# Patient Record
Sex: FEMALE | Race: WHITE | NOT HISPANIC OR LATINO | Employment: FULL TIME | RURAL
[De-identification: names, ages, dates, MRNs, and addresses within clinical notes are randomized per-mention and may not be internally consistent; named-entity substitution may affect disease eponyms.]

---

## 2017-01-16 ENCOUNTER — OFFICE VISIT (OUTPATIENT)
Dept: CARDIOLOGY | Facility: CLINIC | Age: 47
End: 2017-01-16

## 2017-01-16 VITALS
SYSTOLIC BLOOD PRESSURE: 104 MMHG | BODY MASS INDEX: 39.27 KG/M2 | RESPIRATION RATE: 20 BRPM | DIASTOLIC BLOOD PRESSURE: 76 MMHG | WEIGHT: 230 LBS | HEART RATE: 68 BPM | HEIGHT: 64 IN

## 2017-01-16 DIAGNOSIS — I25.10 CORONARY ARTERY DISEASE INVOLVING NATIVE CORONARY ARTERY OF NATIVE HEART WITHOUT ANGINA PECTORIS: Primary | ICD-10-CM

## 2017-01-16 PROCEDURE — 93000 ELECTROCARDIOGRAM COMPLETE: CPT | Performed by: INTERNAL MEDICINE

## 2017-01-16 PROCEDURE — 99213 OFFICE O/P EST LOW 20 MIN: CPT | Performed by: INTERNAL MEDICINE

## 2017-01-16 NOTE — PROGRESS NOTES
Erna Lam  1970  Date of Office Visit: 01/16/2017  Encounter Provider: Vini Lorenzo MD  Place of Service: [facility] CARDIOLOGY    Chief complaint  Follow-up coronary artery disease. Hyperlipidemia. Tobacco abuse.      History of Present Illness  Dr. Blackwood:   I had the pleasure of seeing your patient back in follow-up today. As you well know, she  is a very pleasant, 46-year-old female with a medical history of hyperlipidemia, diabetes,  and tobacco abuse who came to me with a report of chest discomfort, typically during times  of stress. She denied any chest discomfort during exertion. Her chest pain was three to  four out of intensity and dull and pressure-like. It did not radiate and nothing made it  better.      She had an evaluation by you, which showed her to have a suboptimal exercise capacity  during Myoview stress test and did have evidence of anterior ischemia.      I took her for a catheterization and really all she had was a 50% mid left anterior  descending stenosis that certainly did not appear to be hemodynamically significant. She  had diffuse 20% stenoses in the other vessel and preserved ejection fraction.      I placed her on medical management for coronary artery disease including aspirin  and Imdur along with a beta-blocker. She did not like the way the beta-blocker made her  feel and felt that it made her depressed. She stopped that.       She states that over the holiday season she had a rough go of it; dealing with bronchitis and a runny nose along with sore throat.  She was on a couple different antibiotic regimens including Cipro for a very short period of time and then a Z-Yann and some cough syrup.  She states that this has improved.  She still reports that very infrequently, once every few months, that she will have chest discomfort with times of stress or anxiety.  She does not have chest discomfort with activity.  The discomfort that she  has is mild in intensity, short in duration lasting less than 5 minutes, and once again, not exertional.  No orthopnea, PND or lower extremity edema.  No other associated symptoms.            Review of Systems   Constitution: Negative for fever, weakness and malaise/fatigue.   HENT: Negative for nosebleeds and sore throat.    Eyes: Negative for blurred vision and double vision.   Cardiovascular: Positive for chest pain. Negative for claudication, palpitations and syncope.   Respiratory: Negative for cough, shortness of breath and snoring.    Endocrine: Negative for cold intolerance, heat intolerance and polydipsia.   Skin: Negative for itching, poor wound healing and rash.   Musculoskeletal: Negative for joint pain, joint swelling, muscle weakness and myalgias.   Gastrointestinal: Negative for abdominal pain, melena, nausea and vomiting.   Neurological: Negative for light-headedness, loss of balance, seizures and vertigo.   Psychiatric/Behavioral: Negative for altered mental status and depression.          Past Medical History   Diagnosis Date   • Abnormal stress test    • Chest pain on exertion      q   • Coronary artery disease    • Hyperlipidemia        The following portions of the patient's history were reviewed and updated as appropriate: Social history , Family history and Surgical history     Current Outpatient Prescriptions on File Prior to Visit   Medication Sig Dispense Refill   • aspirin 81 MG tablet Take 1 tablet by mouth daily.     • citalopram (CeleXA) 40 MG tablet Take 1 tablet by mouth daily.     • clonazePAM (KlonoPIN) 0.5 MG tablet Take 1 tablet by mouth daily as needed.     • exenatide (BYETTA 10 MCG PEN) 10 MCG/0.04ML injection Inject 10 mcg under the skin 2 (two) times a day.     • glimepiride (AMARYL) 4 MG tablet Take 1 tablet by mouth daily.     • isosorbide mononitrate (IMDUR) 30 MG 24 hr tablet Take 1 tablet by mouth Daily. 90 tablet 0   • metFORMIN (GLUCOPHAGE) 500 MG tablet Take 1 tablet  "by mouth 2 (two) times a day.     • pantoprazole (PROTONIX) 40 MG EC tablet Take 1 tablet by mouth daily.     • rosuvastatin (CRESTOR) 10 MG tablet Take 1 tablet by mouth Daily. 90 tablet 0   • [DISCONTINUED] fluticasone (FLONASE) 50 MCG/ACT nasal spray 1 spray into each nostril daily.  0   • [DISCONTINUED] Insulin Pen Needle (B-D UF III MINI PEN NEEDLES) 31G X 5 MM misc        No current facility-administered medications on file prior to visit.        Allergies   Allergen Reactions   • Bupropion    • Duloxetine    • Niacin    • Pravastatin    • Pregabalin    • Propoxyphene        Vitals:    01/16/17 1110   BP: 104/76   Pulse: 68   Resp: 20   Weight: 230 lb (104 kg)   Height: 64\" (162.6 cm)     Physical Exam   Constitutional: She is oriented to person, place, and time. She appears well-developed and well-nourished.   HENT:   Head: Normocephalic and atraumatic.   Eyes: Conjunctivae and EOM are normal. No scleral icterus.   Neck: Normal range of motion. Neck supple. Normal carotid pulses, no hepatojugular reflux and no JVD present. Carotid bruit is not present. No tracheal deviation present. No thyromegaly present.   Cardiovascular: Normal rate and regular rhythm.  Exam reveals no gallop and no friction rub.    No murmur heard.  Pulses:       Carotid pulses are 2+ on the right side, and 2+ on the left side.       Radial pulses are 2+ on the right side, and 2+ on the left side.        Femoral pulses are 2+ on the right side, and 2+ on the left side.       Dorsalis pedis pulses are 2+ on the right side, and 2+ on the left side.        Posterior tibial pulses are 2+ on the right side, and 2+ on the left side.   Pulmonary/Chest: Breath sounds normal. No respiratory distress. She has no decreased breath sounds. She has no wheezes. She has no rhonchi. She has no rales. She exhibits no tenderness.   Abdominal: Soft. Bowel sounds are normal. She exhibits no distension. There is no tenderness. There is no rebound. "   Musculoskeletal: She exhibits no edema or deformity.   Neurological: She is alert and oriented to person, place, and time. She has normal strength. No sensory deficit.   Skin: No rash noted. No erythema.   Psychiatric: She has a normal mood and affect. Her behavior is normal.       No components found for: CBC  No results found for: CMP  No components found for: LIPID  No results found for: BMP      ECG 12 Lead  Date/Time: 1/16/2017 11:43 AM  Performed by: CATERINA PARKINSON  Authorized by: CATERINA PARKINSON   Comparison: compared with previous ECG from 7/11/2016  Similar to previous ECG  Rhythm: sinus rhythm  Rate: normal  Conduction: conduction normal  ST Segments: ST segments normal  T Waves: T waves normal  QRS axis: normal  Clinical impression: normal ECG               DISCUSSION/SUMMARY She is a very pleasant 46-year-old female with a medical history of coronary artery disease documented on previous catheterization with just a 40-50% mid LAD stenosis, hyperlipidemia, along with diabetes mellitus who presents back for followup.      1. Coronary artery disease.  She has no exertional angina.  She is now tolerating low dose Crestor therapy and her LDL is reasonable at less than 100.  Continue aspirin lifelong.  Continue Crestor at current dose, her control is reasonable.  Continue Imdur.  No ischemic evaluation warranted at this time.    2. Hyperlipidemia, as above.     I will plan to see her back in six months or earlier with problems.      Coronary Artery Disease  Assessment  • The patient has no angina    Plan  • Lifestyle modifications discussed include adhering to a heart healthy diet, avoidance of tobacco products, maintenance of a healthy weight, medication compliance and regular monitoring of cholesterol and blood pressure    Subjective - Objective  • Current antiplatelet therapy includes aspirin 81 mg

## 2017-02-08 RX ORDER — ROSUVASTATIN CALCIUM 10 MG/1
TABLET, COATED ORAL
Qty: 90 TABLET | Refills: 1 | Status: SHIPPED | OUTPATIENT
Start: 2017-02-08 | End: 2017-11-10 | Stop reason: SDUPTHER

## 2017-02-08 RX ORDER — ISOSORBIDE MONONITRATE 30 MG/1
TABLET, EXTENDED RELEASE ORAL
Qty: 90 TABLET | Refills: 1 | Status: SHIPPED | OUTPATIENT
Start: 2017-02-08 | End: 2017-08-04 | Stop reason: SDUPTHER

## 2017-08-04 RX ORDER — ISOSORBIDE MONONITRATE 30 MG/1
30 TABLET, EXTENDED RELEASE ORAL DAILY
Qty: 90 TABLET | Refills: 1 | Status: SHIPPED | OUTPATIENT
Start: 2017-08-04 | End: 2018-02-05 | Stop reason: SDUPTHER

## 2017-09-25 ENCOUNTER — OFFICE VISIT (OUTPATIENT)
Dept: CARDIOLOGY | Facility: CLINIC | Age: 47
End: 2017-09-25

## 2017-09-25 VITALS
HEART RATE: 74 BPM | SYSTOLIC BLOOD PRESSURE: 128 MMHG | WEIGHT: 234 LBS | BODY MASS INDEX: 39.95 KG/M2 | HEIGHT: 64 IN | DIASTOLIC BLOOD PRESSURE: 78 MMHG

## 2017-09-25 DIAGNOSIS — E78.2 MIXED HYPERLIPIDEMIA: ICD-10-CM

## 2017-09-25 DIAGNOSIS — Z72.0 TOBACCO ABUSE: ICD-10-CM

## 2017-09-25 DIAGNOSIS — I25.10 CORONARY ARTERY DISEASE INVOLVING NATIVE CORONARY ARTERY OF NATIVE HEART WITHOUT ANGINA PECTORIS: Primary | ICD-10-CM

## 2017-09-25 PROCEDURE — 93000 ELECTROCARDIOGRAM COMPLETE: CPT | Performed by: INTERNAL MEDICINE

## 2017-09-25 PROCEDURE — 99214 OFFICE O/P EST MOD 30 MIN: CPT | Performed by: INTERNAL MEDICINE

## 2017-09-25 NOTE — PROGRESS NOTES
Erna Lam  1970  Date of Office Visit: 09/25/17  Encounter Provider: Vini Lorenzo MD  Place of Service: [facility] CARDIOLOGY    Chief complaint  Follow-up coronary artery disease.   Hyperlipidemia.   Tobacco abuse.  Diabetes mellitus      History of Present Illness  Dr. Blackwood:   I had the pleasure of seeing your patient back in follow-up today. As you well know, she is a very pleasant, 47-year-old female with a medical history of hyperlipidemia, diabetes, and tobacco abuse who came to me with a report of chest discomfort, typically during times of stress. She denied any chest discomfort during exertion. Her chest pain was three to four out of intensity and dull and pressure-like. It did not radiate and nothing made it better.      She had an evaluation by you, which showed her to have a suboptimal exercise capacity during Myoview stress test and did have evidence of anterior ischemia. I took her for a catheterization and really all she had was a 50% mid left anterior descending stenosis that certainly did not appear to be hemodynamically significant. She had diffuse 20% stenoses in the other vessel and preserved ejection fraction.      I placed her on medical management for coronary artery disease including aspirin and Imdur along with a beta-blocker. She did not like the way the beta-blocker made her feel and felt that it made her depressed. She stopped that.        Since our last visit, she states that she has been doing well.  She had repeat laboratory work done in 04/2017 and her cholesterol was better controlled.  Her LDL at that point in time was 81, which was an improvement from prior.  She is still smoking a pack of cigarettes a day and, once again, we have discussed cessation.  She denies any chest pain with activity but will continue to have very mild chest discomfort with high levels of stress.  It is short in duration and typically lasts less than two to  three minutes.  It goes away with relaxation and does not get worse with exertion.  This has been present since our initial evaluation.      She does also report very mild bilateral lower extremity edema that is made worse by salt intake or increased soda intake.  This improves with elevation.  She denies orthopnea, paroxysmal nocturnal dyspnea, or lower extremity edema.        Review of Systems   Constitution: Negative for fever, weakness and malaise/fatigue.   HENT: Negative for nosebleeds and sore throat.    Eyes: Negative for blurred vision and double vision.   Cardiovascular: Positive for chest pain. Negative for claudication, palpitations and syncope.   Respiratory: Negative for cough, shortness of breath and snoring.    Endocrine: Negative for cold intolerance, heat intolerance and polydipsia.   Skin: Negative for itching, poor wound healing and rash.   Musculoskeletal: Negative for joint pain, joint swelling, muscle weakness and myalgias.   Gastrointestinal: Negative for abdominal pain, melena, nausea and vomiting.   Neurological: Negative for light-headedness, loss of balance, seizures and vertigo.   Psychiatric/Behavioral: Negative for altered mental status and depression.          Past Medical History:   Diagnosis Date   • Abnormal stress test    • Chest pain on exertion     q   • Coronary artery disease    • Hyperlipidemia        The following portions of the patient's history were reviewed and updated as appropriate: Social history , Family history and Surgical history     Current Outpatient Prescriptions on File Prior to Visit   Medication Sig Dispense Refill   • aspirin 81 MG tablet Take 1 tablet by mouth daily.     • citalopram (CeleXA) 40 MG tablet Take 1 tablet by mouth daily.     • clonazePAM (KlonoPIN) 0.5 MG tablet Take 1 tablet by mouth daily as needed.     • glimepiride (AMARYL) 4 MG tablet Take 1 tablet by mouth daily.     • isosorbide mononitrate (IMDUR) 30 MG 24 hr tablet Take 1 tablet by  "mouth Daily. 90 tablet 1   • metFORMIN (GLUCOPHAGE) 500 MG tablet Take 1 tablet by mouth 2 (two) times a day.     • pantoprazole (PROTONIX) 40 MG EC tablet Take 1 tablet by mouth daily.     • rosuvastatin (CRESTOR) 10 MG tablet TAKE 1 TABLET DAILY 90 tablet 1   • [DISCONTINUED] exenatide (BYETTA 10 MCG PEN) 10 MCG/0.04ML injection Inject 10 mcg under the skin 2 (two) times a day.       No current facility-administered medications on file prior to visit.        Allergies   Allergen Reactions   • Bupropion    • Duloxetine    • Niacin    • Pravastatin    • Pregabalin    • Propoxyphene        Vitals:    09/25/17 1128   BP: 128/78   Pulse: 74   Weight: 234 lb (106 kg)   Height: 64\" (162.6 cm)     Physical Exam   Constitutional: She is oriented to person, place, and time. She appears well-developed and well-nourished.   HENT:   Head: Normocephalic and atraumatic.   Eyes: Conjunctivae and EOM are normal. No scleral icterus.   Neck: Normal range of motion. Neck supple. Normal carotid pulses, no hepatojugular reflux and no JVD present. Carotid bruit is not present. No tracheal deviation present. No thyromegaly present.   Cardiovascular: Normal rate and regular rhythm.  Exam reveals no gallop and no friction rub.    No murmur heard.  Pulses:       Carotid pulses are 2+ on the right side, and 2+ on the left side.       Radial pulses are 2+ on the right side, and 2+ on the left side.        Femoral pulses are 2+ on the right side, and 2+ on the left side.       Dorsalis pedis pulses are 2+ on the right side, and 2+ on the left side.        Posterior tibial pulses are 2+ on the right side, and 2+ on the left side.   Pulmonary/Chest: Breath sounds normal. No respiratory distress. She has no decreased breath sounds. She has no wheezes. She has no rhonchi. She has no rales. She exhibits no tenderness.   Abdominal: Soft. Bowel sounds are normal. She exhibits no distension. There is no tenderness. There is no rebound. "   Musculoskeletal: She exhibits no edema or deformity.   Neurological: She is alert and oriented to person, place, and time. She has normal strength. No sensory deficit.   Skin: No rash noted. No erythema.   Psychiatric: She has a normal mood and affect. Her behavior is normal.       No components found for: CBC  No results found for: CMP  No components found for: LIPID  No results found for: BMP      ECG 12 Lead  Date/Time: 9/25/2017 11:42 AM  Performed by: CATERINA PARKINSON  Authorized by: CATERINA PARKINSON   Comparison: compared with previous ECG from 1/16/2017  Similar to previous ECG  Rhythm: sinus rhythm  Rate: normal  Conduction: conduction normal  ST Segments: ST segments normal  T Waves: T waves normal  QRS axis: normal  Clinical impression: normal ECG           DISCUSSION/SUMMARY   47-year-old female with a medical history of coronary artery disease documented on previous catheterization with just a 40-50% mid LAD stenosis, hyperlipidemia, along with diabetes mellitus who presents back for followup.  As her last visit she has not had any significant change.  She continues to have occasional chest discomfort typically with times of high stress.  She denies any exertional angina.  She continues to smoke.    1. Coronary artery disease.  Non-obstructive    -Continue aspirin lifelong.      -Continue Crestor at current dose    -Continue Imdur   2. Hyperlipidemia, as above.  Her LDL is better controlled at this point in time.  Continue Crestor at current dose.  3.  Diabetes mellitus: Per primary.  4.  Tobacco abuse: Smoking cessation has been recommended.  I have discussed this once again with her.    I will plan to see her back in one year or earlier with problems.      Coronary Artery Disease  Assessment  • The patient has no angina    Plan  • Lifestyle modifications discussed include adhering to a heart healthy diet, avoidance of tobacco products, maintenance of a healthy weight, medication compliance  and regular monitoring of cholesterol and blood pressure    Subjective - Objective  • Current antiplatelet therapy includes aspirin 81 mg

## 2017-11-10 RX ORDER — ROSUVASTATIN CALCIUM 10 MG/1
TABLET, COATED ORAL
Qty: 90 TABLET | Refills: 1 | Status: SHIPPED | OUTPATIENT
Start: 2017-11-10 | End: 2018-02-12 | Stop reason: SDUPTHER

## 2018-02-05 RX ORDER — ISOSORBIDE MONONITRATE 30 MG/1
30 TABLET, EXTENDED RELEASE ORAL DAILY
Qty: 90 TABLET | Refills: 1 | Status: SHIPPED | OUTPATIENT
Start: 2018-02-05 | End: 2018-02-12 | Stop reason: SDUPTHER

## 2018-02-12 RX ORDER — ROSUVASTATIN CALCIUM 10 MG/1
10 TABLET, COATED ORAL DAILY
Qty: 90 TABLET | Refills: 1 | Status: SHIPPED | OUTPATIENT
Start: 2018-02-12 | End: 2018-10-15 | Stop reason: SDUPTHER

## 2018-02-12 RX ORDER — ISOSORBIDE MONONITRATE 30 MG/1
30 TABLET, EXTENDED RELEASE ORAL DAILY
Qty: 90 TABLET | Refills: 1 | Status: SHIPPED | OUTPATIENT
Start: 2018-02-12 | End: 2018-07-31 | Stop reason: SDUPTHER

## 2018-07-31 RX ORDER — ISOSORBIDE MONONITRATE 30 MG/1
TABLET, EXTENDED RELEASE ORAL
Qty: 90 TABLET | Refills: 1 | Status: SHIPPED | OUTPATIENT
Start: 2018-07-31 | End: 2019-01-28 | Stop reason: SDUPTHER

## 2018-10-16 RX ORDER — ROSUVASTATIN CALCIUM 10 MG/1
TABLET, COATED ORAL
Qty: 90 TABLET | Refills: 1 | Status: SHIPPED | OUTPATIENT
Start: 2018-10-16 | End: 2018-10-26 | Stop reason: SDUPTHER

## 2018-10-26 RX ORDER — ROSUVASTATIN CALCIUM 10 MG/1
10 TABLET, COATED ORAL DAILY
Qty: 90 TABLET | Refills: 0 | Status: SHIPPED | OUTPATIENT
Start: 2018-10-26 | End: 2019-04-27 | Stop reason: SDUPTHER

## 2018-12-03 ENCOUNTER — OFFICE VISIT (OUTPATIENT)
Dept: CARDIOLOGY | Facility: CLINIC | Age: 48
End: 2018-12-03

## 2018-12-03 VITALS
HEIGHT: 64 IN | DIASTOLIC BLOOD PRESSURE: 82 MMHG | BODY MASS INDEX: 38.41 KG/M2 | SYSTOLIC BLOOD PRESSURE: 122 MMHG | WEIGHT: 225 LBS | HEART RATE: 81 BPM

## 2018-12-03 DIAGNOSIS — Z72.0 TOBACCO ABUSE: ICD-10-CM

## 2018-12-03 DIAGNOSIS — I25.10 CORONARY ARTERY DISEASE INVOLVING NATIVE CORONARY ARTERY OF NATIVE HEART WITHOUT ANGINA PECTORIS: Primary | ICD-10-CM

## 2018-12-03 DIAGNOSIS — E78.2 MIXED HYPERLIPIDEMIA: ICD-10-CM

## 2018-12-03 PROCEDURE — 93000 ELECTROCARDIOGRAM COMPLETE: CPT | Performed by: INTERNAL MEDICINE

## 2018-12-03 PROCEDURE — 99214 OFFICE O/P EST MOD 30 MIN: CPT | Performed by: INTERNAL MEDICINE

## 2018-12-03 RX ORDER — LORAZEPAM 0.5 MG/1
0.5 TABLET ORAL EVERY 8 HOURS PRN
COMMUNITY

## 2018-12-03 RX ORDER — CYCLOBENZAPRINE HCL 10 MG
1 TABLET ORAL 3 TIMES DAILY PRN
COMMUNITY
Start: 2018-12-02

## 2018-12-03 NOTE — PROGRESS NOTES
Erna Lam  1970  Date of Office Visit: 12/3/18  Encounter Provider: Vini Lorenzo MD  Place of Service: [facility] CARDIOLOGY    Chief complaint  Follow-up coronary artery disease.   Hyperlipidemia.   Tobacco abuse.  Diabetes mellitus      History of Present Illness  Dr. Blackwood:   I had the pleasure of seeing your patient back in follow-up today. As you well know, she is a very pleasant, 48-year-old female with a medical history of hyperlipidemia, diabetes, and tobacco abuse who came to me with a report of chest discomfort, typically during times of stress. She denied any chest discomfort during exertion. Her chest pain was three to four out of intensity and dull and pressure-like. It did not radiate and nothing made it better.      She had an evaluation by you, which showed her to have a suboptimal exercise capacity during Myoview stress test and did have evidence of anterior ischemia. I took her for a catheterization and really all she had was a 50% mid left anterior descending stenosis that certainly did not appear to be hemodynamically significant. She had diffuse 20% stenoses in the other vessel and preserved ejection fraction. I placed her on medical management for coronary artery disease including aspirin and Imdur along with a beta-blocker. She did not like the way the beta-blocker made her feel and felt that it made her depressed. She stopped that.      Since her last visit, she has been doing well.  She denies any chest pain or dyspnea on exertion.  She continues to smoke unfortunately.  She is tolerating her current medical regimen and continues to follow with you for her diabetes mellitus.        Review of Systems   Constitution: Negative for fever, weakness and malaise/fatigue.   HENT: Negative for nosebleeds and sore throat.    Eyes: Negative for blurred vision and double vision.   Cardiovascular: Positive for chest pain. Negative for claudication,  palpitations and syncope.   Respiratory: Negative for cough, shortness of breath and snoring.    Endocrine: Negative for cold intolerance, heat intolerance and polydipsia.   Skin: Negative for itching, poor wound healing and rash.   Musculoskeletal: Negative for joint pain, joint swelling, muscle weakness and myalgias.   Gastrointestinal: Negative for abdominal pain, melena, nausea and vomiting.   Neurological: Negative for light-headedness, loss of balance, seizures and vertigo.   Psychiatric/Behavioral: Negative for altered mental status and depression.          Past Medical History:   Diagnosis Date   • Abnormal stress test    • Chest pain on exertion     q   • Coronary artery disease    • Hyperlipidemia        The following portions of the patient's history were reviewed and updated as appropriate: Social history , Family history and Surgical history     Current Outpatient Medications on File Prior to Visit   Medication Sig Dispense Refill   • aspirin 81 MG tablet Take 1 tablet by mouth daily.     • citalopram (CeleXA) 40 MG tablet Take 1 tablet by mouth daily.     • clonazePAM (KlonoPIN) 0.5 MG tablet Take 1 tablet by mouth daily as needed.     • cyclobenzaprine (FLEXERIL) 10 MG tablet Take 1 tablet by mouth 3 (Three) Times a Day As Needed.     • glimepiride (AMARYL) 4 MG tablet Take 1 tablet by mouth daily.     • isosorbide mononitrate (IMDUR) 30 MG 24 hr tablet TAKE 1 TABLET DAILY 90 tablet 1   • LORazepam (ATIVAN) 0.5 MG tablet Take 0.5 mg by mouth Every 8 (Eight) Hours As Needed for Anxiety.     • metFORMIN (GLUCOPHAGE) 500 MG tablet Take 1,000 mg by mouth 2 (Two) Times a Day.     • pantoprazole (PROTONIX) 40 MG EC tablet Take 1 tablet by mouth daily.     • rosuvastatin (CRESTOR) 10 MG tablet Take 1 tablet by mouth Daily. 90 tablet 0   • Semaglutide (OZEMPIC) 0.25 or 0.5 MG/DOSE solution pen-injector Inject 0.5 mg under the skin into the appropriate area as directed 1 (One) Time Per Week.       No current  "facility-administered medications on file prior to visit.        Allergies   Allergen Reactions   • Bupropion    • Duloxetine    • Niacin    • Pravastatin    • Pregabalin    • Propoxyphene        Vitals:    12/03/18 1435   BP: 122/82   Pulse: 81   Weight: 102 kg (225 lb)   Height: 162.6 cm (64\")     Physical Exam   Constitutional: She is oriented to person, place, and time. She appears well-developed and well-nourished.   HENT:   Head: Normocephalic and atraumatic.   Eyes: Conjunctivae and EOM are normal. No scleral icterus.   Neck: Normal range of motion. Neck supple. Normal carotid pulses, no hepatojugular reflux and no JVD present. Carotid bruit is not present. No tracheal deviation present. No thyromegaly present.   Cardiovascular: Normal rate and regular rhythm. Exam reveals no gallop and no friction rub.   No murmur heard.  Pulses:       Carotid pulses are 2+ on the right side, and 2+ on the left side.       Radial pulses are 2+ on the right side, and 2+ on the left side.        Femoral pulses are 2+ on the right side, and 2+ on the left side.       Dorsalis pedis pulses are 2+ on the right side, and 2+ on the left side.        Posterior tibial pulses are 2+ on the right side, and 2+ on the left side.   Pulmonary/Chest: Breath sounds normal. No respiratory distress. She has no decreased breath sounds. She has no wheezes. She has no rhonchi. She has no rales. She exhibits no tenderness.   Abdominal: Soft. Bowel sounds are normal. She exhibits no distension. There is no tenderness. There is no rebound.   Musculoskeletal: She exhibits no edema or deformity.   Neurological: She is alert and oriented to person, place, and time. She has normal strength. No sensory deficit.   Skin: No rash noted. No erythema.   Psychiatric: She has a normal mood and affect. Her behavior is normal.       No components found for: CBC  No results found for: CMP  No components found for: LIPID  No results found for: BMP      ECG 12 " Lead  Date/Time: 12/3/2018 3:10 PM  Performed by: Vini Lorenzo MD  Authorized by: Vini Lorenzo MD   Comparison: compared with previous ECG from 9/25/2017  Rhythm: sinus rhythm  Rate: normal  ST Segments: ST segments normal  T Waves: T waves normal  QRS axis: normal  Clinical impression: normal ECG                DISCUSSION/SUMMARY   48-year-old female with a medical history of coronary artery disease documented on previous catheterization with just a 40-50% mid LAD stenosis, hyperlipidemia, along with diabetes mellitus who presents back for followup. She no longer complains of chest pain. She continues to smoke.    1. Coronary artery disease.  Non-obstructive. No additional chest pain noted    -Continue aspirin lifelong.      -Continue Crestor at current dose    -Continue Imdur   2. Hyperlipidemia, Continue Crestor at current dose.  3.  Diabetes mellitus: Per primary.  4.  Tobacco abuse: Smoking cessation has been recommended.  I have discussed this once again with her.    I will plan to see her back in one year or earlier with problems.      Coronary Artery Disease  Assessment  • The patient has no angina    Plan  • Lifestyle modifications discussed include adhering to a heart healthy diet, avoidance of tobacco products, maintenance of a healthy weight, medication compliance and regular monitoring of cholesterol and blood pressure    Subjective - Objective  • Current antiplatelet therapy includes aspirin 81 mg

## 2019-01-28 RX ORDER — ISOSORBIDE MONONITRATE 30 MG/1
TABLET, EXTENDED RELEASE ORAL
Qty: 90 TABLET | Refills: 1 | Status: SHIPPED | OUTPATIENT
Start: 2019-01-28 | End: 2019-07-16 | Stop reason: SDUPTHER

## 2019-04-29 RX ORDER — ROSUVASTATIN CALCIUM 10 MG/1
TABLET, COATED ORAL
Qty: 90 TABLET | Refills: 0 | Status: SHIPPED | OUTPATIENT
Start: 2019-04-29 | End: 2019-07-16 | Stop reason: SDUPTHER

## 2019-07-16 RX ORDER — ISOSORBIDE MONONITRATE 30 MG/1
TABLET, EXTENDED RELEASE ORAL
Qty: 90 TABLET | Refills: 1 | Status: SHIPPED | OUTPATIENT
Start: 2019-07-16 | End: 2020-01-13

## 2019-07-16 RX ORDER — ROSUVASTATIN CALCIUM 10 MG/1
10 TABLET, COATED ORAL DAILY
Qty: 90 TABLET | Refills: 1 | Status: SHIPPED | OUTPATIENT
Start: 2019-07-16 | End: 2020-03-30

## 2019-12-10 PROBLEM — R94.39 ABNORMAL CARDIOVASCULAR STRESS TEST: Status: ACTIVE | Noted: 2019-12-10

## 2019-12-10 PROBLEM — R07.9 CHEST PAIN: Status: ACTIVE | Noted: 2019-12-10

## 2019-12-16 ENCOUNTER — OFFICE VISIT (OUTPATIENT)
Dept: CARDIOLOGY | Facility: CLINIC | Age: 49
End: 2019-12-16

## 2019-12-16 VITALS
DIASTOLIC BLOOD PRESSURE: 80 MMHG | HEIGHT: 64 IN | WEIGHT: 218 LBS | BODY MASS INDEX: 37.22 KG/M2 | SYSTOLIC BLOOD PRESSURE: 122 MMHG | HEART RATE: 80 BPM

## 2019-12-16 DIAGNOSIS — Z72.0 TOBACCO ABUSE: ICD-10-CM

## 2019-12-16 DIAGNOSIS — R07.9 CHEST PAIN, UNSPECIFIED TYPE: ICD-10-CM

## 2019-12-16 DIAGNOSIS — E78.2 MIXED HYPERLIPIDEMIA: Primary | ICD-10-CM

## 2019-12-16 DIAGNOSIS — I25.10 CORONARY ARTERY DISEASE INVOLVING NATIVE CORONARY ARTERY OF NATIVE HEART WITHOUT ANGINA PECTORIS: ICD-10-CM

## 2019-12-16 DIAGNOSIS — E10.59 TYPE 1 DIABETES MELLITUS WITH OTHER CIRCULATORY COMPLICATION (HCC): ICD-10-CM

## 2019-12-16 PROBLEM — E10.9 DIABETES MELLITUS TYPE I (HCC): Status: ACTIVE | Noted: 2019-12-16

## 2019-12-16 PROCEDURE — 99214 OFFICE O/P EST MOD 30 MIN: CPT | Performed by: INTERNAL MEDICINE

## 2019-12-16 PROCEDURE — 93000 ELECTROCARDIOGRAM COMPLETE: CPT | Performed by: INTERNAL MEDICINE

## 2019-12-16 NOTE — PROGRESS NOTES
Erna Lam  1970  Date of Office Visit: 12/16/19  Encounter Provider: Vini Lorenzo MD  Place of Service: [facility]    Chief complaint  Follow-up coronary artery disease.   Hyperlipidemia.   Tobacco abuse.  Diabetes mellitus      History of Present Illness  Dr. Blackwood:   I had the pleasure of seeing your patient back in follow-up today. As you well know, she is a very pleasant, 49-year-old female with a medical history of hyperlipidemia, diabetes, and tobacco abuse who came to me initially with a report of chest discomfort, typically during times of stress. She denied any chest discomfort during exertion. Her chest pain was three to four out of intensity and dull and pressure-like. It did not radiate and nothing made it better.      She had an evaluation by you, which showed her to have a suboptimal exercise capacity during Myoview stress test and did have evidence of anterior ischemia. I took her for a catheterization and really all she had was a 50% mid left anterior descending stenosis that certainly did not appear to be hemodynamically significant. She had diffuse 20% stenoses in the other vessel and preserved ejection fraction. I placed her on medical management for coronary artery disease including aspirin and Imdur along with a beta-blocker. She did not like the way the beta-blocker made her feel and felt that it made her depressed. She stopped that.      Since our last visit, she has been doing well.  Unfortunately, she continues to smoke and really has not made any significant change in the amount that she is smoking.  Her weight is down a little bit from 225 to 218 pounds.  She occasionally will have chest discomfort that is short in duration and comes on with stress.  She denies any chest discomfort with activity.  She has no orthopnea or paroxysmal nocturnal dyspnea.  Her blood pressure and heart rate are well controlled.         Review of  Systems   Constitution: Negative for fever and malaise/fatigue.   HENT: Negative for nosebleeds and sore throat.    Eyes: Negative for blurred vision and double vision.   Cardiovascular: Negative for chest pain, claudication, palpitations and syncope.   Respiratory: Negative for cough, shortness of breath and snoring.    Endocrine: Negative for cold intolerance, heat intolerance and polydipsia.   Skin: Negative for itching, poor wound healing and rash.   Musculoskeletal: Negative for joint pain, joint swelling, muscle weakness and myalgias.   Gastrointestinal: Negative for abdominal pain, melena, nausea and vomiting.   Neurological: Negative for light-headedness, loss of balance, seizures, vertigo and weakness.   Psychiatric/Behavioral: Negative for altered mental status and depression.          Past Medical History:   Diagnosis Date   • Abnormal stress test    • Chest pain on exertion     q   • Coronary artery disease    • Hyperlipidemia        The following portions of the patient's history were reviewed and updated as appropriate: Social history , Family history and Surgical history     Current Outpatient Medications on File Prior to Visit   Medication Sig Dispense Refill   • aspirin 81 MG tablet Take 1 tablet by mouth daily.     • citalopram (CeleXA) 40 MG tablet Take 1 tablet by mouth daily.     • clonazePAM (KlonoPIN) 0.5 MG tablet Take 1 tablet by mouth daily as needed.     • cyclobenzaprine (FLEXERIL) 10 MG tablet Take 1 tablet by mouth 3 (Three) Times a Day As Needed.     • glimepiride (AMARYL) 4 MG tablet Take 1 tablet by mouth daily.     • isosorbide mononitrate (IMDUR) 30 MG 24 hr tablet TAKE 1 TABLET DAILY 90 tablet 1   • LORazepam (ATIVAN) 0.5 MG tablet Take 0.5 mg by mouth Every 8 (Eight) Hours As Needed for Anxiety.     • metFORMIN (GLUCOPHAGE) 500 MG tablet Take 1,000 mg by mouth 2 (Two) Times a Day.     • Misc Natural Products (BLACK CHERRY CONCENTRATE PO) Take  by mouth.     • pantoprazole  "(PROTONIX) 40 MG EC tablet Take 1 tablet by mouth daily.     • rosuvastatin (CRESTOR) 10 MG tablet Take 1 tablet by mouth Daily. 90 tablet 1   • Semaglutide (OZEMPIC) 0.25 or 0.5 MG/DOSE solution pen-injector Inject 0.5 mg under the skin into the appropriate area as directed 1 (One) Time Per Week.       No current facility-administered medications on file prior to visit.        Allergies   Allergen Reactions   • Bupropion    • Duloxetine    • Niacin    • Pravastatin    • Pregabalin    • Propoxyphene        Vitals:    12/16/19 1002   BP: 122/80   Pulse: 80   Weight: 98.9 kg (218 lb)   Height: 162.6 cm (64\")     Physical Exam   Constitutional: She is oriented to person, place, and time. She appears well-developed and well-nourished.   HENT:   Head: Normocephalic and atraumatic.   Eyes: Conjunctivae and EOM are normal. No scleral icterus.   Neck: Normal range of motion. Neck supple. Normal carotid pulses, no hepatojugular reflux and no JVD present. Carotid bruit is not present. No tracheal deviation present. No thyromegaly present.   Cardiovascular: Normal rate and regular rhythm. Exam reveals no gallop and no friction rub.   No murmur heard.  Pulses:       Carotid pulses are 2+ on the right side, and 2+ on the left side.       Radial pulses are 2+ on the right side, and 2+ on the left side.        Femoral pulses are 2+ on the right side, and 2+ on the left side.       Dorsalis pedis pulses are 2+ on the right side, and 2+ on the left side.        Posterior tibial pulses are 2+ on the right side, and 2+ on the left side.   Pulmonary/Chest: Breath sounds normal. No respiratory distress. She has no decreased breath sounds. She has no wheezes. She has no rhonchi. She has no rales. She exhibits no tenderness.   Abdominal: Soft. Bowel sounds are normal. She exhibits no distension. There is no tenderness. There is no rebound.   Musculoskeletal: She exhibits no edema or deformity.   Neurological: She is alert and oriented to " person, place, and time. She has normal strength. No sensory deficit.   Skin: No rash noted. No erythema.   Psychiatric: She has a normal mood and affect. Her behavior is normal.       No results found for: CBC  No results found for: CMP  No components found for: LIPID  No results found for: BMP      ECG 12 Lead  Date/Time: 12/16/2019 10:28 AM  Performed by: Vini Lorenzo MD  Authorized by: Vini Lorenzo MD   Comparison: compared with previous ECG from 12/3/2018  Similar to previous ECG  Rhythm: sinus rhythm  Rate: normal  QRS axis: normal    Clinical impression: normal ECG                DISCUSSION/SUMMARY   49-year-old female with a medical history of coronary artery disease documented on previous catheterization with just a 40-50% mid LAD stenosis, hyperlipidemia, along with diabetes mellitus who presents back for followup.  She continues to smoke. Her chest pain is infrequent and atypical. Her blood pressure is currently well controlled.     1. Coronary artery disease.  Non-obstructive. No additional chest pain noted    -Continue aspirin lifelong.      -Continue Crestor at current dose    -Continue Imdur   2. Hyperlipidemia, Continue Crestor at current dose.  3.  Diabetes mellitus: Per primary.  4.  Tobacco abuse: Smoking cessation has been recommended.  I have discussed this once again with her.    Erna Lam is a current cigarettes user.  She currently smokes 1 pack of cigarettes and packs of cigarettes per day. I have educated her on the risk of diseases from using tobacco products such as cancer, COPD and heart diease.     I advised her to quit and she is not willing to quit.    I spent 5 minutes counseling the patient.        I will plan to see her back in one year or earlier with problems.

## 2020-01-13 RX ORDER — ISOSORBIDE MONONITRATE 30 MG/1
TABLET, EXTENDED RELEASE ORAL
Qty: 90 TABLET | Refills: 3 | Status: SHIPPED | OUTPATIENT
Start: 2020-01-13 | End: 2020-11-23

## 2020-03-30 RX ORDER — ROSUVASTATIN CALCIUM 10 MG/1
TABLET, COATED ORAL
Qty: 90 TABLET | Refills: 1 | Status: SHIPPED | OUTPATIENT
Start: 2020-03-30 | End: 2020-09-11

## 2020-09-11 RX ORDER — ROSUVASTATIN CALCIUM 10 MG/1
TABLET, COATED ORAL
Qty: 30 TABLET | Refills: 0 | Status: SHIPPED | OUTPATIENT
Start: 2020-09-11 | End: 2020-10-23

## 2020-10-23 RX ORDER — ROSUVASTATIN CALCIUM 10 MG/1
TABLET, COATED ORAL
Qty: 30 TABLET | Refills: 0 | Status: SHIPPED | OUTPATIENT
Start: 2020-10-23 | End: 2020-11-23

## 2020-11-23 RX ORDER — ROSUVASTATIN CALCIUM 10 MG/1
TABLET, COATED ORAL
Qty: 90 TABLET | Refills: 2 | Status: SHIPPED | OUTPATIENT
Start: 2020-11-23 | End: 2021-07-26

## 2020-11-23 RX ORDER — ISOSORBIDE MONONITRATE 30 MG/1
TABLET, EXTENDED RELEASE ORAL
Qty: 90 TABLET | Refills: 3 | Status: SHIPPED | OUTPATIENT
Start: 2020-11-23 | End: 2021-11-22

## 2020-12-10 ENCOUNTER — OFFICE VISIT (OUTPATIENT)
Dept: CARDIOLOGY | Facility: CLINIC | Age: 50
End: 2020-12-10

## 2020-12-10 VITALS
HEIGHT: 64 IN | OXYGEN SATURATION: 98 % | SYSTOLIC BLOOD PRESSURE: 122 MMHG | BODY MASS INDEX: 34.83 KG/M2 | DIASTOLIC BLOOD PRESSURE: 72 MMHG | HEART RATE: 91 BPM | WEIGHT: 204 LBS

## 2020-12-10 DIAGNOSIS — I25.10 CORONARY ARTERY DISEASE INVOLVING NATIVE CORONARY ARTERY OF NATIVE HEART, ANGINA PRESENCE UNSPECIFIED: Primary | ICD-10-CM

## 2020-12-10 PROCEDURE — 99214 OFFICE O/P EST MOD 30 MIN: CPT | Performed by: INTERNAL MEDICINE

## 2020-12-10 NOTE — PROGRESS NOTES
[facility]      Patient Name: Erna Lam  :1970  Age: 50 y.o.  Encounter Provider:  Cholo Swift Jr, MD      Chief Complaint: No chief complaint on file.        HPI  Erna Lam is a 50 y.o. female previously followed by Dr. Lorenzo who presents for follow-up evaluation.  She was initially evaluated for chest pain and positive stress study prompted evaluation in the catheterization laboratory.  Mild to moderate nonobstructive coronary artery disease was noted and she is done well with medical therapy.  She is noted some noncardiac right-sided chest pain but also was found to have a right-sided thoracic mass.  CT chest is pending.  The chest discomfort is a soreness that will last for several hours and has no relationship to exertional activity.  No associated shortness of breath.  She is not noted any change in the chest discomfort with breathing pattern.  She denies any associated nausea, vomiting or diaphoresis with the chest discomfort.  She denies any exertional dyspnea.  No orthopnea, PND or edema.  No palpitations, dizziness or syncope.  She is tolerating medications well.  Unfortunately she continues to smoke.  She is lost another 14 pounds in the past year and she attributes this to optimizing nutritional choices.  Family history was reviewed is not pertinent to this clinic visit.      The following portions of the patient's history were reviewed and updated as appropriate: allergies, current medications, past family history, past medical history, past social history, past surgical history and problem list.      Review of Systems   Constitution: Negative for chills and fever.   Cardiovascular: Positive for chest pain. Negative for leg swelling, near-syncope, orthopnea, palpitations, paroxysmal nocturnal dyspnea and syncope.   Respiratory: Negative for cough and wheezing.    Skin: Negative for itching and rash.   Musculoskeletal: Negative for joint pain and joint  "swelling.   Gastrointestinal: Negative for bloating and abdominal pain.   Neurological: Negative for dizziness and focal weakness.   Psychiatric/Behavioral: Negative for altered mental status and suicidal ideas.       OBJECTIVE:   Vital Signs  There were no vitals filed for this visit.  Estimated body mass index is 37.42 kg/m² as calculated from the following:    Height as of 12/16/19: 162.6 cm (64\").    Weight as of 12/16/19: 98.9 kg (218 lb).    Vitals signs reviewed.   Constitutional:       Appearance: Healthy appearance. Not in distress.   Neck:      Vascular: No JVR. JVD normal.   Pulmonary:      Effort: Pulmonary effort is normal.      Breath sounds: No wheezing. No rhonchi. No rales.      Comments: Poor air entry bilaterally  Chest:      Chest wall: Not tender to palpatation.   Cardiovascular:      PMI at left midclavicular line. Normal rate. Regular rhythm.      Murmurs: There is no murmur.      No gallop. No click. No rub.   Pulses:     Intact distal pulses.   Edema:     Peripheral edema absent.   Abdominal:      General: Bowel sounds are normal.      Palpations: Abdomen is soft.      Tenderness: There is no abdominal tenderness.   Musculoskeletal: Normal range of motion.         General: No tenderness.   Skin:     General: Skin is warm and dry.   Neurological:      General: No focal deficit present.      Mental Status: Alert and oriented to person, place and time.         Procedures          ASSESSMENT:     Coronary artery disease  Dyslipidemia  Diabetes  GERD  Lung mass    PLAN OF CARE:     1. Chest pain -noncardiac characteristics and we will follow imaging for lung mass.  Patient will continue to monitor symptoms and will let us know if there is any change in frequency, intensity or quality of the chest discomfort especially with any relationship to exertional activity.  Long discussion about need for tobacco cessation.  Monitor clinical progress for further treatment recommendations.  2. Dyslipidemia " -LDL in 2019 was 93.  She has lipid profile coming up in 1 month with PCP.  We will follow those results before consideration for altering current therapy.  Continue rosuvastatin.  3. Tobacco abuse -Long discussion about need for tobacco cessation and abstinence from nicotine products.  4. GERD  5. Lung mass -imaging planned as above               Discharge Medications          Accurate as of December 10, 2020  9:12 AM. If you have any questions, ask your nurse or doctor.            Continue These Medications      Instructions Start Date   aspirin 81 MG tablet   1 tablet, Oral, Daily      BLACK CHERRY CONCENTRATE PO   Oral      citalopram 40 MG tablet  Commonly known as: CeleXA   1 tablet, Oral, Daily      clonazePAM 0.5 MG tablet  Commonly known as: KlonoPIN   1 tablet, Oral, Daily PRN      cyclobenzaprine 10 MG tablet  Commonly known as: FLEXERIL   1 tablet, Oral, 3 Times Daily PRN      glimepiride 4 MG tablet  Commonly known as: AMARYL   1 tablet, Oral, Daily      isosorbide mononitrate 30 MG 24 hr tablet  Commonly known as: IMDUR   TAKE 1 TABLET DAILY      LORazepam 0.5 MG tablet  Commonly known as: ATIVAN   0.5 mg, Oral, Every 8 Hours PRN      metFORMIN 500 MG tablet  Commonly known as: GLUCOPHAGE   1,000 mg, Oral, 2 Times Daily      Ozempic (0.25 or 0.5 MG/DOSE) 2 MG/1.5ML solution pen-injector  Generic drug: Semaglutide(0.25 or 0.5MG/DOS)   0.5 mg, Subcutaneous, Weekly      pantoprazole 40 MG EC tablet  Commonly known as: PROTONIX   1 tablet, Oral, Daily      rosuvastatin 10 MG tablet  Commonly known as: CRESTOR   TAKE 1 TABLET DAILY             Thank you for allowing me to participate in the care of your patient,      Sincerely,   Cholo Swift MD   [facility]  12/10/20  09:12 EST

## 2021-03-05 DIAGNOSIS — M79.89 MASS OF SOFT TISSUE OF CHEST: Primary | ICD-10-CM

## 2021-03-05 DIAGNOSIS — M25.80: ICD-10-CM

## 2021-07-26 RX ORDER — ROSUVASTATIN CALCIUM 10 MG/1
TABLET, COATED ORAL
Qty: 90 TABLET | Refills: 2 | Status: SHIPPED | OUTPATIENT
Start: 2021-07-26 | End: 2021-12-09 | Stop reason: SDUPTHER

## 2021-11-22 RX ORDER — ISOSORBIDE MONONITRATE 30 MG/1
TABLET, EXTENDED RELEASE ORAL
Qty: 90 TABLET | Refills: 3 | Status: SHIPPED | OUTPATIENT
Start: 2021-11-22 | End: 2021-12-09 | Stop reason: SDUPTHER

## 2021-12-09 ENCOUNTER — OFFICE VISIT (OUTPATIENT)
Dept: CARDIOLOGY | Facility: CLINIC | Age: 51
End: 2021-12-09

## 2021-12-09 VITALS
BODY MASS INDEX: 33.12 KG/M2 | WEIGHT: 194 LBS | HEIGHT: 64 IN | HEART RATE: 86 BPM | SYSTOLIC BLOOD PRESSURE: 140 MMHG | DIASTOLIC BLOOD PRESSURE: 84 MMHG

## 2021-12-09 DIAGNOSIS — Z72.0 TOBACCO ABUSE: ICD-10-CM

## 2021-12-09 DIAGNOSIS — I25.10 CORONARY ARTERY DISEASE INVOLVING NATIVE CORONARY ARTERY OF NATIVE HEART WITHOUT ANGINA PECTORIS: Primary | ICD-10-CM

## 2021-12-09 DIAGNOSIS — E78.2 MIXED HYPERLIPIDEMIA: ICD-10-CM

## 2021-12-09 PROCEDURE — 99214 OFFICE O/P EST MOD 30 MIN: CPT | Performed by: INTERNAL MEDICINE

## 2021-12-09 RX ORDER — ROSUVASTATIN CALCIUM 10 MG/1
10 TABLET, COATED ORAL DAILY
Qty: 90 TABLET | Refills: 3 | Status: SHIPPED | OUTPATIENT
Start: 2021-12-09 | End: 2022-04-07

## 2021-12-09 RX ORDER — ASPIRIN 81 MG/1
81 TABLET ORAL DAILY
Qty: 90 TABLET | Refills: 4 | Status: SHIPPED | OUTPATIENT
Start: 2021-12-09 | End: 2023-02-01

## 2021-12-09 RX ORDER — ISOSORBIDE MONONITRATE 30 MG/1
30 TABLET, EXTENDED RELEASE ORAL DAILY
Qty: 90 TABLET | Refills: 3 | Status: SHIPPED | OUTPATIENT
Start: 2021-12-09 | End: 2022-07-11

## 2021-12-09 NOTE — PROGRESS NOTES
[facility]      Patient Name: Erna Lam  :1970  Age: 51 y.o.  Encounter Provider:  Cholo Swift Jr, MD      Chief Complaint: Follow-up and management of coronary artery disease      HPI  Erna Lam is a 51 y.o. female previously followed by Dr. Lorenzo who presents for follow-up evaluation.  She was initially evaluated for chest pain and positive stress study prompted evaluation in the catheterization laboratory.  Mild to moderate nonobstructive coronary artery disease was noted and she is done well with medical therapy.  She is noted some noncardiac right-sided chest pain but also was found to have a right-sided thoracic mass.  Biopsy of the mass was performed which showed benign findings per patient report.  She is no longer having chest pain with activity.  She denies any exertional dyspnea.  No orthopnea, PND or edema.  No palpitations, dizziness or syncope.  She is tolerating medications well.  Unfortunately she continues to smoke.  Family history was reviewed is not pertinent to this clinic visit.      The following portions of the patient's history were reviewed and updated as appropriate: allergies, current medications, past family history, past medical history, past social history, past surgical history and problem list.      Review of Systems   Constitutional: Negative for chills and fever.   Cardiovascular: Positive for chest pain. Negative for leg swelling, near-syncope, orthopnea, palpitations, paroxysmal nocturnal dyspnea and syncope.   Respiratory: Negative for cough and wheezing.    Skin: Negative for itching and rash.   Musculoskeletal: Negative for joint pain and joint swelling.   Gastrointestinal: Negative for bloating and abdominal pain.   Neurological: Negative for dizziness and focal weakness.   Psychiatric/Behavioral: Negative for altered mental status and suicidal ideas.     ROS was reviewed, updated and amended when necessary.    OBJECTIVE:   Vital  "Signs  There were no vitals filed for this visit.  Estimated body mass index is 33.3 kg/m² as calculated from the following:    Height as of 11/23/21: 162.6 cm (64\").    Weight as of 11/23/21: 88 kg (194 lb).    Vitals reviewed.   Constitutional:       Appearance: Healthy appearance. Not in distress.   Neck:      Vascular: No JVR. JVD normal.   Pulmonary:      Effort: Pulmonary effort is normal.      Breath sounds: No wheezing. No rhonchi. No rales.      Comments: Poor air entry bilaterally  Chest:      Chest wall: Not tender to palpatation.   Cardiovascular:      PMI at left midclavicular line. Normal rate. Regular rhythm.      Murmurs: There is no murmur.      No gallop. No click. No rub.   Pulses:     Intact distal pulses.   Edema:     Peripheral edema absent.   Abdominal:      General: Bowel sounds are normal.      Palpations: Abdomen is soft.      Tenderness: There is no abdominal tenderness.   Musculoskeletal: Normal range of motion.         General: No tenderness. Skin:     General: Skin is warm and dry.   Neurological:      General: No focal deficit present.      Mental Status: Alert and oriented to person, place and time.     Physical exam was reviewed, updated and amended when necessary.    Procedures          ASSESSMENT:     Coronary artery disease  Dyslipidemia  Diabetes  GERD  Lung mass    PLAN OF CARE:     1. Chest pain -resolved.  Benign findings on biopsy per patient report.  She does not require any follow-up for this according to the physician who evaluated her in [place].  2. Coronary artery disease -continue aspirin and statin.  LDL is at goal.  3. Dyslipidemia -LDL in 2021 was 71.  Continue rosuvastatin.  4. Tobacco abuse -Long discussion about need for tobacco cessation and abstinence from nicotine products.  He is not ready to quit at this time.  5. GERD  6. Elevated blood pressure without diagnosis of hypertension -per the ophthalmologist she is showing signs of hypertensive retinopathy.  " Blood pressure elevated today in clinic.  I have advised her to purchase a blood pressure cuff and start twice daily blood pressure monitoring.  Sodium restricted diet is counseled.      Return to clinic 12 months         Discharge Medications          Accurate as of December 9, 2021  9:17 AM. If you have any questions, ask your nurse or doctor.            Continue These Medications      Instructions Start Date   aspirin 81 MG tablet   1 tablet, Oral, Daily      BLACK CHERRY CONCENTRATE PO   Oral      citalopram 40 MG tablet  Commonly known as: CeleXA   1 tablet, Oral, Daily      clonazePAM 0.5 MG tablet  Commonly known as: KlonoPIN   1 tablet, Oral, Daily PRN      cyclobenzaprine 10 MG tablet  Commonly known as: FLEXERIL   1 tablet, Oral, 3 Times Daily PRN      gabapentin 100 MG capsule  Commonly known as: NEURONTIN   No dose, route, or frequency recorded.      glimepiride 4 MG tablet  Commonly known as: AMARYL   1 tablet, Oral, Daily      isosorbide mononitrate 30 MG 24 hr tablet  Commonly known as: IMDUR   TAKE 1 TABLET DAILY      LORazepam 0.5 MG tablet  Commonly known as: ATIVAN   0.5 mg, Oral, Every 8 Hours PRN      metFORMIN 1000 MG tablet  Commonly known as: GLUCOPHAGE   No dose, route, or frequency recorded.      naproxen 500 MG tablet  Commonly known as: NAPROSYN   No dose, route, or frequency recorded.      Ozempic (0.25 or 0.5 MG/DOSE) 2 MG/1.5ML solution pen-injector  Generic drug: Semaglutide(0.25 or 0.5MG/DOS)   0.5 mg, Subcutaneous, Weekly      Ozempic (1 MG/DOSE) 4 MG/3ML solution pen-injector  Generic drug: Semaglutide (1 MG/DOSE)   No dose, route, or frequency recorded.      pantoprazole 40 MG EC tablet  Commonly known as: PROTONIX   1 tablet, Oral, Daily      rosuvastatin 10 MG tablet  Commonly known as: CRESTOR   TAKE 1 TABLET DAILY             Thank you for allowing me to participate in the care of your patient,      Sincerely,   Cholo Swift MD   [facility]  12/09/21  09:17  EST

## 2022-04-07 RX ORDER — ROSUVASTATIN CALCIUM 10 MG/1
TABLET, COATED ORAL
Qty: 90 TABLET | Refills: 2 | Status: SHIPPED | OUTPATIENT
Start: 2022-04-07 | End: 2022-11-21

## 2022-07-07 ENCOUNTER — OFFICE VISIT (OUTPATIENT)
Dept: CARDIOLOGY | Facility: CLINIC | Age: 52
End: 2022-07-07

## 2022-07-07 VITALS
DIASTOLIC BLOOD PRESSURE: 84 MMHG | WEIGHT: 175 LBS | HEIGHT: 64 IN | HEART RATE: 75 BPM | SYSTOLIC BLOOD PRESSURE: 130 MMHG | BODY MASS INDEX: 29.88 KG/M2

## 2022-07-07 DIAGNOSIS — I25.10 CORONARY ARTERY DISEASE INVOLVING NATIVE CORONARY ARTERY OF NATIVE HEART WITHOUT ANGINA PECTORIS: ICD-10-CM

## 2022-07-07 DIAGNOSIS — Z72.0 TOBACCO ABUSE: ICD-10-CM

## 2022-07-07 DIAGNOSIS — I10 PRIMARY HYPERTENSION: Primary | ICD-10-CM

## 2022-07-07 PROCEDURE — 99214 OFFICE O/P EST MOD 30 MIN: CPT | Performed by: INTERNAL MEDICINE

## 2022-07-07 RX ORDER — PROPRANOLOL HYDROCHLORIDE 80 MG/1
80 TABLET ORAL
COMMUNITY
Start: 2022-05-31

## 2022-07-07 RX ORDER — CHLORTHALIDONE 25 MG/1
25 TABLET ORAL DAILY
Qty: 30 TABLET | Refills: 5 | Status: SHIPPED | OUTPATIENT
Start: 2022-07-07 | End: 2022-12-19

## 2022-07-07 NOTE — PROGRESS NOTES
[facility]      Patient Name: Erna Lam  :1970  Age: 52 y.o.  Encounter Provider:  Cholo Swift Jr, MD      Chief Complaint: Follow-up and management of coronary artery disease      Coronary Artery Disease  Symptoms include chest pain. Pertinent negatives include no dizziness, leg swelling or palpitations.     Erna aLm is a 52 y.o. female previously followed by Dr. Lorenzo who presents for follow-up evaluation.  She was initially evaluated for chest pain and positive stress study prompted evaluation in the catheterization laboratory.  Mild to moderate nonobstructive coronary artery disease was noted and she is done well with medical therapy.  She is noted some noncardiac right-sided chest pain but also was found to have a right-sided thoracic mass.  Biopsy of the mass was performed which showed benign findings per patient report.  She is no longer having chest pain with activity.  She denies any exertional dyspnea.  No orthopnea, PND or edema.  No palpitations, dizziness or syncope.      Blood pressures been poorly controlled over the last few months.  She brings in a log that shows average 1  systolic blood pressure.  She has occasional chest discomfort and exertional dyspnea but she is tolerating medications well.  Unfortunately she continues to smoke.  Family history was reviewed is not pertinent to this clinic visit.      The following portions of the patient's history were reviewed and updated as appropriate: allergies, current medications, past family history, past medical history, past social history, past surgical history and problem list.      Review of Systems   Constitutional: Negative for chills and fever.   Cardiovascular: Positive for chest pain. Negative for leg swelling, near-syncope, orthopnea, palpitations, paroxysmal nocturnal dyspnea and syncope.   Respiratory: Negative for cough and wheezing.    Skin: Negative for itching and rash.  "  Musculoskeletal: Negative for joint pain and joint swelling.   Gastrointestinal: Negative for bloating and abdominal pain.   Neurological: Negative for dizziness and focal weakness.   Psychiatric/Behavioral: Negative for altered mental status and suicidal ideas.     ROS was reviewed, updated and amended when necessary.    OBJECTIVE:   Vital Signs  Vitals:    07/07/22 0947   BP: 130/84   Pulse: 75     Estimated body mass index is 30.04 kg/m² as calculated from the following:    Height as of this encounter: 162.6 cm (64\").    Weight as of this encounter: 79.4 kg (175 lb).    Vitals reviewed.   Constitutional:       Appearance: Healthy appearance. Not in distress.   Neck:      Vascular: No JVR. JVD normal.   Pulmonary:      Effort: Pulmonary effort is normal.      Breath sounds: No wheezing. No rhonchi. No rales.      Comments: Poor air entry bilaterally  Chest:      Chest wall: Not tender to palpatation.   Cardiovascular:      PMI at left midclavicular line. Normal rate. Regular rhythm.      Murmurs: There is no murmur.      No gallop. No click. No rub.   Pulses:     Intact distal pulses.   Edema:     Peripheral edema absent.   Abdominal:      General: Bowel sounds are normal.      Palpations: Abdomen is soft.      Tenderness: There is no abdominal tenderness.   Musculoskeletal: Normal range of motion.         General: No tenderness. Skin:     General: Skin is warm and dry.   Neurological:      General: No focal deficit present.      Mental Status: Alert and oriented to person, place and time.     Physical exam was reviewed, updated and amended when necessary.    Procedures          ASSESSMENT:     Coronary artery disease  Dyslipidemia  Diabetes  GERD  Lung mass    PLAN OF CARE:     1. Chest pain -more likely related to uncontrolled blood pressure.  Benign findings on biopsy of right lung mass per patient report.  She does not require any follow-up for this according to the physician who evaluated her in " [place].  2. Coronary artery disease -continue aspirin and statin.  LDL is at goal.  We need to optimize afterload reduction.  3. Dyslipidemia -LDL in 2021 was 71.  Continue rosuvastatin.  4. Tobacco abuse -Long discussion about need for tobacco cessation and abstinence from nicotine products.  He is not ready to quit at this time.  5. GERD  6. Hypertension -patient was placed on and taken off of lisinopril.  She is currently on high-dose propranolol.  Add chlorthalidone.  BMP in 2 weeks.  Blood pressure log to be dropped off in office at that time.      Return to clinic 6 months         Discharge Medications          Accurate as of July 7, 2022 10:27 AM. If you have any questions, ask your nurse or doctor.            Continue These Medications      Instructions Start Date   aspirin 81 MG EC tablet   81 mg, Oral, Daily      BLACK CHERRY CONCENTRATE PO   Oral      citalopram 40 MG tablet  Commonly known as: CeleXA   1 tablet, Oral, Daily      cyclobenzaprine 10 MG tablet  Commonly known as: FLEXERIL   1 tablet, Oral, 3 Times Daily PRN      glimepiride 4 MG tablet  Commonly known as: AMARYL   1 tablet, Oral, Daily      isosorbide mononitrate 30 MG 24 hr tablet  Commonly known as: IMDUR   30 mg, Oral, Daily      LORazepam 0.5 MG tablet  Commonly known as: ATIVAN   0.5 mg, Oral, Every 8 Hours PRN      metFORMIN 1000 MG tablet  Commonly known as: GLUCOPHAGE   No dose, route, or frequency recorded.      naproxen 500 MG tablet  Commonly known as: NAPROSYN   No dose, route, or frequency recorded.      pantoprazole 40 MG EC tablet  Commonly known as: PROTONIX   1 tablet, Oral, Daily      propranolol 80 MG tablet  Commonly known as: INDERAL   80 mg      rosuvastatin 10 MG tablet  Commonly known as: CRESTOR   TAKE 1 TABLET DAILY      Semaglutide(0.25 or 0.5MG/DOS) 2 MG/1.5ML solution pen-injector  Commonly known as: OZEMPIC   1 mg, Subcutaneous, Weekly      Ozempic (1 MG/DOSE) 4 MG/3ML solution pen-injector  Generic drug:  Semaglutide (1 MG/DOSE)   No dose, route, or frequency recorded.         Stop These Medications    clonazePAM 0.5 MG tablet  Commonly known as: KlonoPIN  Stopped by: Cholo Swift Jr, MD            Thank you for allowing me to participate in the care of your patient,      Sincerely,   Cholo Swift MD   [facility]  07/07/22  10:27 EDT

## 2022-07-11 RX ORDER — ISOSORBIDE MONONITRATE 30 MG/1
TABLET, EXTENDED RELEASE ORAL
Qty: 90 TABLET | Refills: 1 | Status: SHIPPED | OUTPATIENT
Start: 2022-07-11 | End: 2023-04-04

## 2022-11-21 RX ORDER — ROSUVASTATIN CALCIUM 10 MG/1
TABLET, COATED ORAL
Qty: 90 TABLET | Refills: 2 | Status: SHIPPED | OUTPATIENT
Start: 2022-11-21

## 2022-12-19 RX ORDER — CHLORTHALIDONE 25 MG/1
TABLET ORAL
Qty: 30 TABLET | Refills: 5 | Status: SHIPPED | OUTPATIENT
Start: 2022-12-19

## 2023-02-01 RX ORDER — ASPIRIN 81 MG/1
TABLET, COATED ORAL
Qty: 90 TABLET | Refills: 3 | Status: SHIPPED | OUTPATIENT
Start: 2023-02-01

## 2023-04-04 RX ORDER — ISOSORBIDE MONONITRATE 30 MG/1
TABLET, EXTENDED RELEASE ORAL
Qty: 90 TABLET | Refills: 0 | Status: SHIPPED | OUTPATIENT
Start: 2023-04-04

## 2023-07-24 RX ORDER — ROSUVASTATIN CALCIUM 10 MG/1
10 TABLET, COATED ORAL DAILY
Qty: 90 TABLET | Refills: 3 | Status: SHIPPED | OUTPATIENT
Start: 2023-07-24

## 2023-07-24 NOTE — TELEPHONE ENCOUNTER
Ella Dumont called and said that this prescription was called in as if you were licensed for [place] and the did not have you license number? I asked what needs to be done and they said to just resend the medication again as it may have been a glitch in the system. Could you just sign off on this again?    Dior Xiong RN  Triage [facility] [place]

## 2024-08-07 ENCOUNTER — TELEPHONE (OUTPATIENT)
Dept: CARDIOLOGY | Facility: CLINIC | Age: 54
End: 2024-08-07
Payer: COMMERCIAL

## 2024-08-07 RX ORDER — ROSUVASTATIN CALCIUM 10 MG/1
10 TABLET, COATED ORAL DAILY
Qty: 90 TABLET | Refills: 2 | Status: SHIPPED | OUTPATIENT
Start: 2024-08-07

## 2024-08-07 NOTE — TELEPHONE ENCOUNTER
This patient has not been seen since 2022.  I will refill Crestor however she needs to be contacted schedule next available follow-up appointment with me or Dr. Swift   .  Schedule-please call her to schedule

## 2025-05-29 RX ORDER — ROSUVASTATIN CALCIUM 10 MG/1
10 TABLET, COATED ORAL DAILY
Qty: 90 TABLET | Refills: 2 | Status: SHIPPED | OUTPATIENT
Start: 2025-05-29